# Patient Record
Sex: FEMALE | Race: WHITE
[De-identification: names, ages, dates, MRNs, and addresses within clinical notes are randomized per-mention and may not be internally consistent; named-entity substitution may affect disease eponyms.]

---

## 2019-09-27 ENCOUNTER — HOSPITAL ENCOUNTER (EMERGENCY)
Dept: HOSPITAL 74 - JERFT | Age: 34
Discharge: HOME | End: 2019-09-27
Payer: COMMERCIAL

## 2019-09-27 VITALS — BODY MASS INDEX: 21.2 KG/M2

## 2019-09-27 VITALS — SYSTOLIC BLOOD PRESSURE: 99 MMHG | DIASTOLIC BLOOD PRESSURE: 54 MMHG | HEART RATE: 85 BPM | TEMPERATURE: 98.6 F
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
## 2019-09-27 DIAGNOSIS — D64.9: ICD-10-CM
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
## 2019-09-27 DIAGNOSIS — S59.802A: Primary | ICD-10-CM
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
## 2019-09-27 NOTE — PDOC
History of Present Illness





- General


Chief Complaint: Pain, Acute


Stated Complaint: L ELBOW PAIN


Time Seen by Provider: 19 11:18


History Source: Patient


Exam Limitations: No Limitations





- History of Present Illness


Initial Comments: 





19 11:25


34 year old female with history of  and anemia presents with left 

elbow pain since yesterday.  Patient reports being accidentally hit by a push 

up bar yesterday. Complaining of pain with movement, denies numbness or 

tingling.  


Occurred: reports: yesterday


Severity: reports: moderate


Upper Extremity Pain Location: left: forearm


Method of Injury: reports: direct blow


Modifying Factors: improves with: immobilization





Extremity Pain Location





- Extremity Pain Location


Extremity Pain Locations: left: elbow





Past History





- Travel


Traveled outside of the country in the last 30 days: No


Close contact w/someone who was outside of country & ill: No





- Past Medical History


Allergies/Adverse Reactions: 


 Allergies











Allergy/AdvReac Type Severity Reaction Status Date / Time


 


No Known Allergies Allergy   Verified 10/21/18 16:21











Home Medications: 


Ambulatory Orders





Ibuprofen 600 mg PO QID #15 tablet 19 








Anemia: Yes


COPD: No





- Immunization History


Immunization Up to Date: Yes





- Psycho Social/Smoking Cessation Hx


Smoking History: Never smoked


Have you smoked in the past 12 months: No


Information on smoking cessation initiated: No


Hx Alcohol Use: No


Drug/Substance Use Hx: No





**Review of Systems





- Review of Systems


Able to Perform ROS?: Yes


Constitutional: Yes: Chills, Fever


HEENTM: No: Nose Bleeding, Throat Swelling, Mouth Pain


Respiratory: No: Orthopnea, Shortness of Breath, Wheezing


Cardiac (ROS): No: Lightheadedness


ABD/GI: No: Poor Appetite, Vomiting, Indigestion


Musculoskeletal: Yes: Joint Pain, Joint Swelling.  No: Back Pain, Neck Pain


Integumentary: No: Bruising, Erythema


Neurological: No: Headache, Numbness





*Physical Exam





- Vital Signs


 Last Vital Signs











Temp Pulse Resp BP Pulse Ox


 


 98.6 F   85   18   99/54 L  100 


 


 19 11:04  19 11:04  19 11:04  19 11:04  19 11:04














- Physical Exam


General Appearance: Yes: Nourished, Appropriately Dressed


HEENT: positive: TMs Normal, Pharynx Normal


Neck: positive: Supple.  negative: Lymphadenopathy (R), Lymphadenopathy (L)


Respiratory/Chest: positive: Lungs Clear


Cardiovascular: positive: Regular Rhythm, Regular Rate


Extremity: positive: Normal Capillary Refill, Swelling, Other (left elbow; 

unable to extend or flex without pain, + bruising noted on elbow, able pronate 

and supinate forearm.)


Neurologic: positive: Fully Oriented, Alert





Medical Decision Making





- Medical Decision Making





19 11:29


34 year old female with history of  and anemia presents with left 

elbow pain since yesterday.  Patient reports being accidentally hit by a push 

up bar yesterday.





left elbow injury 


xray of left elbow 


refused analgesia


urine pregnancy ordered


19 12:51








pain better with analgesia


xray shows no fracture or subluxation 


d/c home 


f/u with ortho 





Discharge





- Discharge Information


Problems reviewed: Yes


Clinical Impression/Diagnosis: 


 Elbow pain, left





Condition: Stable


Disposition: HOME





- Admission


No





- Additional Discharge Information


Prescriptions: 


Ibuprofen 600 mg PO QID #15 tablet





- Follow up/Referral


Referrals: 


Oma Naylor MD [Primary Care Provider] - 


Richard Hayes MD [Staff Physician] -  (call for appointment )





- Patient Discharge Instructions


Additional Instructions: 


Activity as tolerated


Apply ice compress to area for 20 minutes 3 to 4 times daily for 3 days


May take ibuprofen for pain as directed 





- Post Discharge Activity


Work/Back to School Note:  Back to Work